# Patient Record
Sex: FEMALE | Race: WHITE | URBAN - METROPOLITAN AREA
[De-identification: names, ages, dates, MRNs, and addresses within clinical notes are randomized per-mention and may not be internally consistent; named-entity substitution may affect disease eponyms.]

---

## 2018-05-01 ENCOUNTER — TRANSFERRED RECORDS (OUTPATIENT)
Dept: MULTI SPECIALTY CLINIC | Facility: CLINIC | Age: 28
End: 2018-05-01

## 2018-05-01 LAB — PAP SMEAR - HIM PATIENT REPORTED: NEGATIVE

## 2019-08-29 ENCOUNTER — OFFICE VISIT (OUTPATIENT)
Dept: FAMILY MEDICINE | Facility: CLINIC | Age: 29
End: 2019-08-29
Payer: COMMERCIAL

## 2019-08-29 VITALS
HEIGHT: 65 IN | HEART RATE: 70 BPM | OXYGEN SATURATION: 100 % | WEIGHT: 118 LBS | BODY MASS INDEX: 19.66 KG/M2 | TEMPERATURE: 97.7 F | SYSTOLIC BLOOD PRESSURE: 115 MMHG | DIASTOLIC BLOOD PRESSURE: 76 MMHG

## 2019-08-29 DIAGNOSIS — Z00.00 ROUTINE GENERAL MEDICAL EXAMINATION AT A HEALTH CARE FACILITY: Primary | ICD-10-CM

## 2019-08-29 PROCEDURE — 99385 PREV VISIT NEW AGE 18-39: CPT | Performed by: FAMILY MEDICINE

## 2019-08-29 RX ORDER — TRIAMCINOLONE ACETONIDE 1 MG/G
CREAM TOPICAL
COMMUNITY

## 2019-08-29 ASSESSMENT — MIFFLIN-ST. JEOR: SCORE: 1253.18

## 2019-08-29 NOTE — Clinical Note
Pt had Pap Smear done on this date 05/2018, with this group [facility], results were normal. Please abstract.

## 2019-08-29 NOTE — PROGRESS NOTES
SUBJECTIVE:   CC: Shayna Wells is an 28 year old woman who presents for preventive health visit.     Healthy Habits:     Getting at least 3 servings of Calcium per day:  Yes    Bi-annual eye exam:  Yes    Dental care twice a year:  NO    Sleep apnea or symptoms of sleep apnea:  None    Diet:  Regular (no restrictions)    Frequency of exercise:  4-5 days/week    Duration of exercise:  30-45 minutes    Taking medications regularly:  Yes    Medication side effects:  Not applicable    PHQ-2 Total Score: 0    Additional concerns today:  No    Interested in discussing options for contraception - wonders about IUD  Has not really considered nexplanon      THIN PREP REPORT (03/07/2017 4:46 PM CST)  THIN PREP REPORT (03/07/2017 4:46 PM CST)   Component Value Ref Range Performed At Pathologist Signature   SPECIMEN: Screening Thin PREP - Cervical   [facility]     CLINICAL INFORMATION / HISTORY: LMP:                    11-  Source:                 CERVICAL  High Risk:              LOW RISK  Birth Control:          NO  Hormones:               NO  Pregnancy:              NO  Chemo/Radiation:        NO  Previous Abnormal:      NO  Comment:                DO HPV IF ASCUS   [facility]     SPECIMEN ADEQUACY: Satisfactory for Interpretation.  Endocervical Component Present.   [facility]     INTERPRETATION / RESULT: **NEGATIVE FOR INTRAEPITHELIAL LESION OR MALIGNANCY**   [facility]     COMMENTS: This liquid based ThinPrep pap test was also screened using computer  assisted technology.   [facility]     EDUCATIONAL NOTE: It is important to note that the Pap smear is a screening test used as  an aid in detection of cervical cancer and its precursors.  Published  data indicates that the Pap smear has the potential for false negative  and false positive results.  For this reason, periodic repeat testing  and follow-up of any unexplained clinical signs and  symptoms is  recommended.  Positive results should be confirmed with additional  studies as clinically indicated.  (LIZABETH Menjivar; Report Disclaimers and  Informed Expectations about Papanicolaou Smears: [facility]  Med. 1977: [id number])    Comment:   Laine Mccall  (Electronically Signed By)  Date Verified:03/08/2017 16:20    Cytotechnologist:DO  Date Performed:03/08/17                  Today's PHQ-2 Score:   PHQ-2 ( 1999 [employer]) 8/29/2019   Q1: Little interest or pleasure in doing things 0   Q2: Feeling down, depressed or hopeless 0   PHQ-2 Score 0   Q1: Little interest or pleasure in doing things Not at all   Q2: Feeling down, depressed or hopeless Not at all   PHQ-2 Score 0       Abuse: Current or Past(Physical, Sexual or Emotional)- No  Do you feel safe in your environment? Yes    Social History     Tobacco Use     Smoking status: Not on file   Substance Use Topics     Alcohol use: Not on file         Alcohol Use 8/29/2019   Prescreen: >3 drinks/day or >7 drinks/week? No   No flowsheet data found.    Reviewed orders with patient.  Reviewed health maintenance and updated orders accordingly - Yes  Lab work is in process  Labs reviewed in EPIC  There is no problem list on file for this patient.    History reviewed. No pertinent surgical history.    Social History     Tobacco Use     Smoking status: Never Smoker     Smokeless tobacco: Never Used   Substance Use Topics     Alcohol use: Yes     Family History   Problem Relation Age of Onset     Allergies Mother      Stomach Problem Mother      Asthma Mother      Stomach Problem Sister      Osteoporosis Maternal Grandmother      Hypertension Maternal Grandfather      Cerebrovascular Disease Maternal Grandfather      Diabetes Maternal Grandfather      Hyperlipidemia Maternal Grandfather      Breast Cancer Paternal Grandmother      Hypertension Paternal Grandfather      Colon Cancer Paternal Grandfather      Hyperlipidemia Paternal Grandfather            Mammogram  "not appropriate for this patient based on age.    Pertinent mammograms are reviewed under the imaging tab.  History of abnormal Pap smear: NO - age 30-65 PAP every 5 years with negative HPV co-testing recommended     Reviewed and updated as needed this visit by clinical staff         Reviewed and updated as needed this visit by Provider        History reviewed. No pertinent past medical history.   History reviewed. No pertinent surgical history.    Review of Systems  CONSTITUTIONAL: NEGATIVE for fever, chills, change in weight  INTEGUMENTARU/SKIN: NEGATIVE for worrisome rashes, moles or lesions  EYES: NEGATIVE for vision changes or irritation  ENT: NEGATIVE for ear, mouth and throat problems  RESP: NEGATIVE for significant cough or SOB  BREAST: NEGATIVE for masses, tenderness or discharge  CV: NEGATIVE for chest pain, palpitations or peripheral edema  GI: NEGATIVE for nausea, abdominal pain, heartburn, or change in bowel habits  : NEGATIVE for unusual urinary or vaginal symptoms. Periods are regular.  MUSCULOSKELETAL: NEGATIVE for significant arthralgias or myalgia  NEURO: NEGATIVE for weakness, dizziness or paresthesias  PSYCHIATRIC: NEGATIVE for changes in mood or affect     OBJECTIVE:   /76   Pulse 70   Temp 97.7  F (36.5  C) (Oral)   Ht 1.638 m (5' 4.5\")   Wt 53.5 kg (118 lb)   LMP 08/08/2019 (Approximate)   SpO2 100%   BMI 19.94 kg/m    Physical Exam  GENERAL: healthy, alert and no distress  EYES: Eyes grossly normal to inspection, PERRL and conjunctivae and sclerae normal  HENT: ear canals and TM's normal, nose and mouth without ulcers or lesions  NECK: no adenopathy, no asymmetry, masses, or scars and thyroid normal to palpation  RESP: lungs clear to auscultation - no rales, rhonchi or wheezes  CV: regular rate and rhythm, normal S1 S2, no S3 or S4, no murmur, click or rub, no peripheral edema and peripheral pulses strong  ABDOMEN: soft, nontender, no hepatosplenomegaly, no masses and bowel " sounds normal  MS: no gross musculoskeletal defects noted, no edema  SKIN: no suspicious lesions or rashes  NEURO: Normal strength and tone, mentation intact and speech normal  PSYCH: mentation appears normal, affect normal/bright    Diagnostic Test Results:  Labs reviewed in [facility] care everywhere    ASSESSMENT/PLAN:   1. Routine general medical examination at a health care facility  See avs  Reviewed well adult screens and guidance     We talked about birth contorl options  Interested in either IUD or possibly nexplann  reviewed risks benefits fr these  Discussed insertion for IUD and can return if desires this at any time      COUNSELING:  Reviewed preventive health counseling, as reflected in patient instructions       Regular exercise       Healthy diet/nutrition    There is no height or weight on file to calculate BMI.         has no tobacco history on file.      Counseling Resources:  ATP IV Guidelines  Pooled Cohorts Equation Calculator  Breast Cancer Risk Calculator  FRAX Risk Assessment  ICSI Preventive Guidelines  Dietary Guidelines for Americans, 2010  USDA's MyPlate  ASA Prophylaxis  Lung CA Screening    Anu Guillaume MD  [facility]

## 2019-08-29 NOTE — NURSING NOTE
"Chief Complaint   Patient presents with     Physical     /76   Pulse 70   Temp 97.7  F (36.5  C) (Oral)   Ht 1.638 m (5' 4.5\")   Wt 53.5 kg (118 lb)   LMP 08/08/2019 (Approximate)   SpO2 100%   BMI 19.94 kg/m   Estimated body mass index is 19.94 kg/m  as calculated from the following:    Height as of this encounter: 1.638 m (5' 4.5\").    Weight as of this encounter: 53.5 kg (118 lb).  Medication Reconciliation: complete      Health Maintenance that is potentially due pending provider review:  Pap Smear    Pt had Pap Smear done on this date 05/2018, with this group [facility], results were normal. Sent to abstracting.     ELI Palomo  "

## 2019-09-01 ENCOUNTER — TELEPHONE (OUTPATIENT)
Dept: FAMILY MEDICINE | Facility: CLINIC | Age: 29
End: 2019-09-01

## 2019-09-01 NOTE — TELEPHONE ENCOUNTER
Clinic Action Needed:Yes, please return call    Reason for Call: Shayna had an appointment on 8/29/19, a routine general physical and she thought a birth control pill was being prescribed to her.  Reviewed notes in chart and although bc options were discussed, I don't see an Rx for any BC pills.    Please return call to discuss further, thank you.     Routed to: [facility]    Teresa Samaniego, RN  [facility]

## 2019-09-04 RX ORDER — LEVONORGESTREL/ETHIN.ESTRADIOL 0.1-0.02MG
1 TABLET ORAL DAILY
Qty: 90 TABLET | Refills: 3 | Status: SHIPPED | OUTPATIENT
Start: 2019-09-04 | End: 2020-08-10

## 2019-09-04 NOTE — TELEPHONE ENCOUNTER
Yes I called her to review this  She has taken BC pills before - knows how to start them, no questoins  Has heavy periods.  Will try monophasic pill - if any BTB we can try a higher dosed pill    She was fine with this    SN

## 2020-08-10 DIAGNOSIS — Z78.9 USES BIRTH CONTROL: ICD-10-CM

## 2020-08-10 RX ORDER — TIMOLOL MALEATE 5 MG/ML
SOLUTION/ DROPS OPHTHALMIC
Qty: 28 TABLET | Refills: 0 | Status: SHIPPED | OUTPATIENT
Start: 2020-08-10

## 2020-08-10 NOTE — TELEPHONE ENCOUNTER
"Last Written Prescription Date:  9/4/2019  Last Fill Quantity: 90,  # refills: 4   Last office visit: 8/29/2019 with prescribing provider:  Yes, SN - physical   Future Office Visit:      Medication is being filled for 1 time refill only due to:  Patient needs to be seen because it has been more than one year since last visit.   Due for physical.  Sarah Mckay RN    Requested Prescriptions   Signed Prescriptions Disp Refills    VIENVA 0.1-20 MG-MCG tablet 28 tablet 0     Sig: TAKE 1 TABLET BY MOUTH DAILY       Contraceptives Protocol Passed - 8/10/2020  3:11 AM        Passed - Patient is not a current smoker if age is 35 or older        Passed - Recent (12 mo) or future (30 days) visit within the authorizing provider's specialty     Patient has had an office visit with the authorizing provider or a provider within the authorizing providers department within the previous 12 mos or has a future within next 30 days. See \"Patient Info\" tab in inbasket, or \"Choose Columns\" in Meds & Orders section of the refill encounter.              Passed - Medication is active on med list        Passed - No active pregnancy on record        Passed - No positive pregnancy test in past 12 months                   "